# Patient Record
(demographics unavailable — no encounter records)

---

## 2024-10-09 NOTE — PHYSICAL EXAM
[de-identified] : There is discomfort in the hip with internal and external rotation.  There is no discomfort with adduction or abduction of the right hip.

## 2024-10-09 NOTE — ASSESSMENT
[FreeTextEntry1] : . #Possible pregnancy: Will obtain a serum pregnancy test  #Right hip pain: In her age group most likely mild degenerative joint disease.  Will refer for physical therapy.  Since patient is possibly pregnant, I suggested she refrain from taking over-the-counter analgesics at this time.  Will not obtain x-ray because of possible pregnancy.  If patient is indeed pregnant, we will consider an MRI if the pain persist.

## 2024-10-09 NOTE — HISTORY OF PRESENT ILLNESS
[FreeTextEntry8] : Patient is here for 2 reasons: 1 she missed her period and did a home pregnancy test which was positive.  She would like a blood test to determine if she is indeed pregnant.  Also, she has been having right hip pain.  She states it is related to her work where she is very physically active since she works in a  organization. Patient also has a sprain in her right ankle.  She has seen the orthopedic surgeon.  Her MRI confirmed a grade 2 sprain of the ATFL and a high-grade sprain of the CFL.  He placed her ankle in a brace.  She is scheduled to have physical therapy. The hip pain is on the right and gets worse as the patient walks greater distances.

## 2024-12-23 NOTE — PHYSICAL EXAM
[TextEntry] : Constitutional: Patient is well nourished, well appearing and in no distress Pulmonary: No respiratory distress Neuro: Speech normal, alert and oriented Psychiatric: Normal mood, normal insight/judgement, normal affect

## 2024-12-23 NOTE — HISTORY OF PRESENT ILLNESS
[FreeTextEntry8] : This is a televisit with audio and video component to which patient agreed.  Patient is currently in [place] and I am located at [address] [place] [address] in [place].  The purpose of the visit is to complete disability forms. Patient stumbled on stairs and twisted her left ankle.  Previously she had a similar incident and twisted her right ankle and continues to be in a brace on the right.  Patient visited an orthopedic surgeon who put her in a boot and told her that she will likely need to be in the boot for 3 months.  He is planning to schedule an MRI in a few weeks. Patient's job requires her to be on her feet a lot and move constantly.  She will be unable to fulfill her responsibilities to her employer with her current injury.

## 2024-12-23 NOTE — ASSESSMENT
[FreeTextEntry1] : . #New sprain of left ankle: Patient will follow-up with orthopedic surgeon and will remain in the boot.  I will complete her disability forms and indicates she would be unable to work for approximately 3 months. In the meantime, patient will have physical therapy, rest the ankle and can use NSAIDs as needed.

## 2025-03-24 NOTE — HISTORY OF PRESENT ILLNESS
[FreeTextEntry1] : Follow up visit for ankle sprain and dizziness [de-identified] : This is a televisit with audio and video components to which patient agreed.Patient states she is still disabled from both ankle sprains particularly the 1 on the left.  She is requesting that I fill out FMLA forms.  She will also be sending the disability forms.  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 She saw the orthopedic surgeon who said that she needs another 8 weeks of conservative treatment before decision about surgery can be made. Patient states she struck her head on a marble shelf at which time "I saw stars".  Since then she has been experiencing lightheadedness.  She had no loss of consciousness at the time of the event.

## 2025-04-07 NOTE — REVIEW OF SYSTEMS
[As Noted in HPI] : as noted in HPI [Dizziness] : dizziness [Lightheadedness] : lightheadedness [Negative] : Heme/Lymph

## 2025-04-07 NOTE — END OF VISIT
[FreeTextEntry3] : I personally saw and examined Ms. EMILY AMOR in detail this visit today. I personally reviewed the HPI, PMH, FH, SH, ROS and medications/allergies. I have spoken to MILLY Jackson regarding the history and have personally determined the assessment and plan of care, and documented this myself. I was present and participated in all key portions of the encounter and all procedures noted above. I have made changes in the body of the note where appropriate.   Attesting Faculty: See Attending Signature Below

## 2025-04-07 NOTE — ASSESSMENT
[FreeTextEntry1] : 46yo female with dizziness, light headedness, intermittent pressure in both ears that started after she hit her head in her shower in December 2024 - suspect post-concussive syndrome but will work up further to r/o any underlying otologic source - Ear exam normal -Audio shows hearing normal downsloping to mild SNHL, type A tymps with excellent discrim -VNG ordered to r/o any inner ear condition causing the dizziness  -Can consider vestibular therapy after VNG is complete -F/U as needed

## 2025-04-07 NOTE — CONSULT LETTER
[Dear  ___] : Dear  [unfilled], [Consult Letter:] : I had the pleasure of evaluating your patient, [unfilled]. [Please see my note below.] : Please see my note below. [Consult Closing:] : Thank you very much for allowing me to participate in the care of this patient.  If you have any questions, please do not hesitate to contact me. [Sincerely,] : Sincerely, [FreeTextEntry3] : Adriana Callejas MD Otolaryngology and Cranial Base Surgery Attending Physician - Department of Otolaryngology and Head & Neck Surgery [facility]  [employer] [facility]

## 2025-04-07 NOTE — HISTORY OF PRESENT ILLNESS
[de-identified] : 48yo female with dizziness, light headedness, intermittent pressure in both ears, hard time looking at the phone feels dizzy, off balance, this started after she hit her head in her shower in December 2024. She had recent imaging CT and MRI at [facility] and did note some white spots but otherwise was clear. She notes that they mentioned possible post-concussive syndrome. She cannot tell if there is any change in her hearing.

## 2025-04-16 NOTE — PHYSICAL EXAM
[MA] : MA [FreeTextEntry2] : Jacob [Appropriately responsive] : appropriately responsive [Alert] : alert [No Acute Distress] : no acute distress [Soft] : soft [Non-tender] : non-tender [Non-distended] : non-distended [No HSM] : No HSM [No Lesions] : no lesions [No Mass] : no mass [Oriented x3] : oriented x3 [Examination Of The Breasts] : a normal appearance [No Masses] : no breast masses were palpable [Labia Majora] : normal [Labia Minora] : normal [Normal] : normal [Tenderness] : nontender [Enlarged ___ wks] : not enlarged [Uterine Adnexae] : normal

## 2025-04-16 NOTE — HISTORY OF PRESENT ILLNESS
[FreeTextEntry1] : 46y/o F presents for annual visit. LMP April, monthly periods, lasts 3-5 days  Sexually active with male partner, accepts STI testing Pt reports malodorous urine over the past 1-2 months    OBHx: LTCS x1 GynHx: hx of abnormal pap 10 years ago, hx of colposcopy    HM Pap 2023 WNL per pt Mammo 2025 BIRADS 1 Colonoscopy never had

## 2025-04-16 NOTE — PLAN
[FreeTextEntry1] : 48 y/o F presents for annual visit.   #HM -Pap with HPV today -Mammo up to date -GI referral for colonoscopy provided  -PHQ9 completed in Feb 2025  #Malodorous urine -UCx sent today   RTO 1 year or PRN jason BLANCAS

## 2025-04-16 NOTE — PLAN
[FreeTextEntry1] : 46 y/o F presents for annual visit.   #HM -Pap with HPV today -Mammo up to date -GI referral for colonoscopy provided  -PHQ9 completed in Feb 2025  #Malodorous urine -UCx sent today   RTO 1 year or PRN jason BLANCAS

## 2025-04-16 NOTE — HISTORY OF PRESENT ILLNESS
[FreeTextEntry1] : 48y/o F presents for annual visit. LMP April, monthly periods, lasts 3-5 days  Sexually active with male partner, accepts STI testing Pt reports malodorous urine over the past 1-2 months    OBHx: LTCS x1 GynHx: hx of abnormal pap 10 years ago, hx of colposcopy    HM Pap 2023 WNL per pt Mammo 2025 BIRADS 1 Colonoscopy never had

## 2025-04-30 NOTE — END OF VISIT
Forms faxed to [phone]   [Time Spent: ___ minutes] : I have spent [unfilled] minutes of time on the encounter which excludes teaching and separately reported services.

## 2025-04-30 NOTE — HISTORY OF PRESENT ILLNESS
[Home] : at home, [unfilled] , at the time of the visit. [Medical Office: ([facility])___] : at the medical office located in  [Telehealth (audio & video)] : This visit was provided via telehealth using real-time 2-way audio visual technology. [Verbal consent obtained from patient] : the patient, [unfilled] [FreeTextEntry1] : Reason for consult: MS  HPI: EMILY AMOR is a 47 year old woman   2010 - had concussion, had white spots on MRI. She believes it was [place] at [facility] Saw Dr. De Santiago who told her that lesions were nonspecific and to keep monitoring.  12/2024 - hit head in the shower against a marble shelf, then had lightheadedness, slight imbalance veering to left.  Went to [facility].  Progressively worse over past several months but better over past several weeks. Referred to concussion clinic, had brain MRI showing lesions. Then referred to [facility] neurology.   ROS/Current Sx: Ibuprofen helps for headaches PT has been helping for balance no cognitive symptoms  PMHX: none  MEDS: ibuprofen prn  ALL: tramadol sensitivity  SHx: no tob, no etoh, no drugs. used to be a ; now on leave since 12/2024.   FHx: mother with autoimmune neurological illness that presented like MS - possible sarcoidosis vs MS, but no abnormalities on MRI. sister with RA.   Exam: AO3.  Normally conversant.  Follows commands, names, and repeats.  Good attention. face symmetric, eomi coordination intact in UEs   MRI brain early 2025 - at least 2 PV lesions, one CC, and at least one R parietal DELIA (best seen on flair axial).    AP: 46yo w/ likely post-concussive syndrome since 12/2024 that had a new brain MRI showing lesions, some of which raise suspicion for MS. She reports having had a prior MRI in 2010, also post-concussion, that also showed lesions and prompted evaluation with MS specialist. She has autoimmune disease in her family. 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    all questions answered, education provided, management discussed at length,  - check MRI C and T spine to evaluate for lesions - she will send me CD of prior brain MRI from 2010 done at [facility]. - vitD check at next visit. - RTC 3wks to discuss further.